# Patient Record
Sex: FEMALE | URBAN - NONMETROPOLITAN AREA
[De-identification: names, ages, dates, MRNs, and addresses within clinical notes are randomized per-mention and may not be internally consistent; named-entity substitution may affect disease eponyms.]

---

## 2021-04-20 ENCOUNTER — APPOINTMENT (OUTPATIENT)
Age: 86
Setting detail: DERMATOLOGY
End: 2021-04-21

## 2021-04-20 DIAGNOSIS — I78.8 OTHER DISEASES OF CAPILLARIES: ICD-10-CM

## 2021-04-20 DIAGNOSIS — L57.0 ACTINIC KERATOSIS: ICD-10-CM

## 2021-04-20 PROBLEM — D48.5 NEOPLASM OF UNCERTAIN BEHAVIOR OF SKIN: Status: ACTIVE | Noted: 2021-04-20

## 2021-04-20 PROCEDURE — 99202 OFFICE O/P NEW SF 15 MIN: CPT | Mod: 25

## 2021-04-20 PROCEDURE — OTHER MIPS QUALITY: OTHER

## 2021-04-20 PROCEDURE — 11311 SHAVE SKIN LESION 0.6-1.0 CM: CPT

## 2021-04-20 PROCEDURE — OTHER SHAVE REMOVAL: OTHER

## 2021-04-20 PROCEDURE — OTHER COUNSELING: OTHER

## 2021-04-20 ASSESSMENT — LOCATION DETAILED DESCRIPTION DERM
LOCATION DETAILED: LEFT SUPERIOR MEDIAL MALAR CHEEK
LOCATION DETAILED: NASAL DORSUM
LOCATION DETAILED: LEFT INFERIOR NASAL CHEEK

## 2021-04-20 ASSESSMENT — LOCATION ZONE DERM
LOCATION ZONE: FACE
LOCATION ZONE: NOSE

## 2021-04-20 ASSESSMENT — LOCATION SIMPLE DESCRIPTION DERM
LOCATION SIMPLE: LEFT CHEEK
LOCATION SIMPLE: NOSE

## 2021-04-20 NOTE — PROCEDURE: MIPS QUALITY
Quality 130: Documentation Of Current Medications In The Medical Record: Current Medications Documented
Quality 110: Preventive Care And Screening: Influenza Immunization: Influenza Immunization previously received during influenza season
Detail Level: Detailed
Quality 226: Preventive Care And Screening: Tobacco Use: Screening And Cessation Intervention: Patient screened for tobacco use and is an ex/non-smoker
Quality 47: Advance Care Plan: Advance Care Planning discussed and documented in the medical record; patient did not wish or was not able to name a surrogate decision maker or provide an advance care plan.
Quality 431: Preventive Care And Screening: Unhealthy Alcohol Use - Screening: Patient screened for unhealthy alcohol use using a single question and scores less than 2 times per year

## 2021-04-20 NOTE — PROCEDURE: SHAVE REMOVAL
Hemostasis: Aluminum Chloride
X Size Of Lesion In Cm (Optional): 0
Add Variable For Additional Medical Justification: No
Path Notes (To The Dermatopathologist): Please check margins.
Consent was obtained from the patient. The risks and benefits to therapy were discussed in detail. Specifically, the risks of infection, scarring, bleeding, prolonged wound healing, incomplete removal, allergy to anesthesia, nerve injury and recurrence were addressed. Prior to the procedure, the treatment site was clearly identified and confirmed by the patient. All components of Universal Protocol/PAUSE Rule completed.
Notification Instructions: Patient will be notified of pathology results. However, patient instructed to call the office if not contacted within 2 weeks.
Anesthesia Type: 1% lidocaine with epinephrine
Was A Bandage Applied: Yes
Size Of Lesion In Cm (Required): 1
Wound Care: Petrolatum
Medical Necessity Information: It is in your best interest to select a reason for this procedure from the list below. All of these items fulfill various CMS LCD requirements except the new and changing color options.
Biopsy Method: Dermablade
Post-Care Instructions: I reviewed with the patient in detail post-care instructions. Patient is to keep the biopsy site dry overnight, and then apply bacitracin twice daily until healed. Patient may apply hydrogen peroxide soaks to remove any crusting.
Billing Type: Third-Party Bill
Detail Level: Detailed
Medical Necessity Clause: This procedure was medically necessary because the lesion that was treated was:

## 2021-05-07 ENCOUNTER — APPOINTMENT (OUTPATIENT)
Age: 86
Setting detail: DERMATOLOGY
End: 2021-05-07

## 2021-05-07 DIAGNOSIS — Z48.817 ENCOUNTER FOR SURGICAL AFTERCARE FOLLOWING SURGERY ON THE SKIN AND SUBCUTANEOUS TISSUE: ICD-10-CM

## 2021-05-07 PROBLEM — C44.311 BASAL CELL CARCINOMA OF SKIN OF NOSE: Status: ACTIVE | Noted: 2021-05-07

## 2021-05-07 PROCEDURE — OTHER REFERRAL: OTHER

## 2021-05-07 PROCEDURE — OTHER POST-OP WOUND CHECK: OTHER

## 2021-05-07 PROCEDURE — OTHER CONSULTATION FOR MOHS SURGERY: OTHER

## 2021-05-07 PROCEDURE — OTHER MIPS QUALITY: OTHER

## 2021-05-07 PROCEDURE — 99024 POSTOP FOLLOW-UP VISIT: CPT

## 2021-05-07 ASSESSMENT — LOCATION ZONE DERM: LOCATION ZONE: NOSE

## 2021-05-07 ASSESSMENT — LOCATION DETAILED DESCRIPTION DERM: LOCATION DETAILED: NASAL DORSUM

## 2021-05-07 ASSESSMENT — LOCATION SIMPLE DESCRIPTION DERM: LOCATION SIMPLE: NOSE

## 2021-05-07 NOTE — PROCEDURE: POST-OP WOUND CHECK
Detail Level: Detailed
Add 77333 Cpt? (Important Note: In 2017 The Use Of 52322 Is Being Tracked By Cms To Determine Future Global Period Reimbursement For Global Periods): no

## 2021-10-04 ENCOUNTER — APPOINTMENT (OUTPATIENT)
Age: 86
Setting detail: DERMATOLOGY
End: 2021-10-04

## 2021-10-04 PROBLEM — C44.92 SQUAMOUS CELL CARCINOMA OF SKIN, UNSPECIFIED: Status: ACTIVE | Noted: 2021-10-04

## 2021-10-04 PROBLEM — C44.311 BASAL CELL CARCINOMA OF SKIN OF NOSE: Status: ACTIVE | Noted: 2021-10-04

## 2021-10-04 PROCEDURE — OTHER MOHS SURGERY: OTHER

## 2021-10-04 PROCEDURE — 17311 MOHS 1 STAGE H/N/HF/G: CPT

## 2022-01-13 ENCOUNTER — APPOINTMENT (OUTPATIENT)
Age: 87
Setting detail: DERMATOLOGY
End: 2022-01-13

## 2022-01-13 DIAGNOSIS — L82.1 OTHER SEBORRHEIC KERATOSIS: ICD-10-CM

## 2022-01-13 DIAGNOSIS — Z85.828 PERSONAL HISTORY OF OTHER MALIGNANT NEOPLASM OF SKIN: ICD-10-CM

## 2022-01-13 PROCEDURE — OTHER COUNSELING: OTHER

## 2022-01-13 PROCEDURE — OTHER MIPS QUALITY: OTHER

## 2022-01-13 PROCEDURE — OTHER REASSURANCE: OTHER

## 2022-01-13 PROCEDURE — 99212 OFFICE O/P EST SF 10 MIN: CPT

## 2022-01-13 ASSESSMENT — LOCATION DETAILED DESCRIPTION DERM
LOCATION DETAILED: NASAL DORSUM
LOCATION DETAILED: LEFT INFERIOR UPPER BACK

## 2022-01-13 ASSESSMENT — LOCATION ZONE DERM
LOCATION ZONE: NOSE
LOCATION ZONE: TRUNK

## 2022-01-13 ASSESSMENT — LOCATION SIMPLE DESCRIPTION DERM
LOCATION SIMPLE: LEFT UPPER BACK
LOCATION SIMPLE: NOSE